# Patient Record
Sex: FEMALE | Race: WHITE | NOT HISPANIC OR LATINO | Employment: FULL TIME | URBAN - METROPOLITAN AREA
[De-identification: names, ages, dates, MRNs, and addresses within clinical notes are randomized per-mention and may not be internally consistent; named-entity substitution may affect disease eponyms.]

---

## 2018-10-17 ENCOUNTER — TRANSCRIBE ORDERS (OUTPATIENT)
Dept: PHYSICAL THERAPY | Facility: CLINIC | Age: 48
End: 2018-10-17

## 2018-10-17 DIAGNOSIS — M54.2 PAIN, NECK: ICD-10-CM

## 2018-10-17 DIAGNOSIS — M25.512 LEFT SHOULDER PAIN, UNSPECIFIED CHRONICITY: Primary | ICD-10-CM

## 2018-10-23 ENCOUNTER — TREATMENT (OUTPATIENT)
Dept: PHYSICAL THERAPY | Facility: CLINIC | Age: 48
End: 2018-10-23

## 2018-10-23 DIAGNOSIS — M54.2 CERVICAL PAIN (NECK): Primary | ICD-10-CM

## 2018-10-23 DIAGNOSIS — M54.50 ACUTE BILATERAL LOW BACK PAIN WITHOUT SCIATICA: ICD-10-CM

## 2018-10-23 DIAGNOSIS — M25.512 ACUTE PAIN OF LEFT SHOULDER: ICD-10-CM

## 2018-10-23 PROCEDURE — 97110 THERAPEUTIC EXERCISES: CPT | Performed by: PHYSICAL THERAPIST

## 2018-10-23 PROCEDURE — 97014 ELECTRIC STIMULATION THERAPY: CPT | Performed by: PHYSICAL THERAPIST

## 2018-10-23 PROCEDURE — 97161 PT EVAL LOW COMPLEX 20 MIN: CPT | Performed by: PHYSICAL THERAPIST

## 2018-10-23 NOTE — PROGRESS NOTES
Physical Therapy Initial Evaluation and Plan of Care        Subjective Evaluation    History of Present Illness  Date of onset: 10/8/2018  Mechanism of injury: Patient reports that she stepped back out of the way for a forkllift and feel backward onto steel.  She states that she had items in her hand and was not able to catch her fall.  She reports that currently she has increased cervical neck pain, left shoulder pain and low back pain.  Has some numbness noted in left UE, denies radicular symptoms in LE.  She states that although she suffers from migraines her headaches have intensified since the fall.   Describes pain as stiffness in neck and sharp shooting pain from neck down into her bilateral shoulders L>R.  Describes her low back as more of a constant pressure which intensifies with prolonged positioning.       Currently working on light duty.  No lifting overhead, prolonged standing, lifting 5#, sitting as needed   Functionallimited with prolonged standing, prolonged sitting, just cannot get comfort with sleeping, concentration is diminshed, bathing, dressing, meal  preparation, house work.      Overhead, out to the side, or behind her back.     Quality of life: good    Pain  Relieving factors: heat, medications and rest  Aggravating factors: squatting, prolonged positioning, outstretched reach, sleeping, standing and overhead activity  Progression: no change    Hand dominance: right    Treatments  Previous treatment: medication  Patient Goals  Patient goals for therapy: independence with ADLs/IADLs, return to sport/leisure activities, decreased pain and increased motion             Objective       Static Posture     Head  Forward.    Shoulders  Elevated and rounded.    Postural Observations  Seated posture: fair  Standing posture: fair  Correction of posture: makes symptoms worse        Palpation   Left   Hypertonic in the levator scapulae, lumbar paraspinals, quadratus lumborum, suboccipitals and upper  trapezius.   Tenderness of the anterior deltoid, biceps, levator scapulae, lumbar paraspinals, quadratus lumborum, suboccipitals and upper trapezius.     Right   Hypertonic in the levator scapulae, lumbar paraspinals, quadratus lumborum, suboccipitals and upper trapezius. Tenderness of the levator scapulae, lumbar paraspinals, quadratus lumborum, suboccipitals and upper trapezius.     Tenderness   Cervical Spine   Tenderness in the left 1st rib and right 1st rib.     Left Hip   Tenderness in the PSIS.     Right Hip   Tenderness in the PSIS.     Active Range of Motion   Left Shoulder   Flexion: 110 degrees with pain  Abduction: 90 degrees with pain  External rotation 45°: 60 degrees with pain    Right Shoulder   Normal active range of motion    Additional Active Range of Motion Details  Cervical AROM %  Cervical Flexion 50%  Cervical Extension 25%  Cervical Right Lateral Flexion 25%  Cervical Left Lateral Flexion 25%  Cervical Right Rotation 25%  Cervical Left Rotation 25%  Lumbar AROM %  Flexion 50%  Right Lateral Rotation 75%  Left Lateral Rotation 75%  Right Rotation 75%  Left Rotation 75%     Strength/Myotome Testing     Additional Strength Details  Strength not assess at evaluation         Assessment & Plan     Assessment  Impairments: abnormal or restricted ROM, activity intolerance, lacks appropriate home exercise program and pain with function  Assessment details: Patient is a 48 year old female who presents with pain in cervical spine, left shoulder and low back.   Cervical spine  Upon evaluation she has significant muscle guarding of suboccipital, cervical paraspinals, upper trap and levator with elevated scapula and first rib.  She has mild low of lordotic curvature.   Left shoulder  Her ROM of left shoulder is restricted to shoulder level in both flexion and abduction,  Her IR is to L5. Increased tenderness at bicipital groove. Strength and special testing not assessed at eval.   Low back  She has  moderate tenderness of lumbar paraspinals with minimal depth of penetration.  Her ROM is most limited in flexion and extension.  Negative SLR and Slump testing.   Prognosis: good  Functional Limitations: carrying objects, lifting, sleeping, walking, pulling, pushing, uncomfortable because of pain, standing and stooping  Goals  Plan Goals: Long Term Goals (10 weeks)    Patient is able to return to work/community activities with minimal to no discomfort  Patient is able to lift 25 lbs from floor to waist  Patient is able to lift 15 lbs from waist to shoulder  Patient is able to lift 10 lbs from shoulder to overhead  Patient is able stand for as long as needed for work/community related tasks.   Patient is able to sit for as long as needed for work/community related tasks.   Patient is able to reciprocally climb steps as needed for daily tasks.   Patient is able to work overhead as needed for work/community activities.       Short Term Goals (5 weeks)    Patient is independent with HEP  Patient is able to sleep without being disrupted by pain.   Patient has full AROM/PROM of left shoulder  Patient has full AROM of cervical spine  Patient has full AROm of lumbar spine  Patient has greater than 50% improvement overall.   Patient is able to perform bathing and grooming activities with use of involved UE extremity.   Patient has improved postural awareness.                 Plan  Therapy options: will be seen for skilled physical therapy services  Planned modality interventions: TENS, ultrasound, thermotherapy (hydrocollator packs) and cryotherapy  Planned therapy interventions: manual therapy, soft tissue mobilization, strengthening, stretching, therapeutic activities, joint mobilization, home exercise program, functional ROM exercises, flexibility and body mechanics training  Duration in visits: 20  Treatment plan discussed with: patient          Therapeutic Exercise:    20     mins  [id number];       Timed Treatment:   20    mins   Total Treatment:     55   mins    PT SIGNATURE: Za Madrid, PT   DATE TREATMENT INITIATED: 10/23/2018    Initial Certification  Certification Period: 1/21/2019  I certify that the therapy services are furnished while this patient is under my care.  The services outlined above are required by this patient, and will be reviewed every 90 days.     PHYSICIAN: Kit Pitts MD      DATE:     Please sign and return via fax to [phone].. Thank you, [facility].

## 2018-10-25 ENCOUNTER — TREATMENT (OUTPATIENT)
Dept: PHYSICAL THERAPY | Facility: CLINIC | Age: 48
End: 2018-10-25

## 2018-10-25 DIAGNOSIS — M25.512 ACUTE PAIN OF LEFT SHOULDER: ICD-10-CM

## 2018-10-25 DIAGNOSIS — M54.2 CERVICAL PAIN (NECK): Primary | ICD-10-CM

## 2018-10-25 DIAGNOSIS — M54.50 ACUTE BILATERAL LOW BACK PAIN WITHOUT SCIATICA: ICD-10-CM

## 2018-10-25 PROCEDURE — 97110 THERAPEUTIC EXERCISES: CPT | Performed by: PHYSICAL THERAPIST

## 2018-10-25 PROCEDURE — 97014 ELECTRIC STIMULATION THERAPY: CPT | Performed by: PHYSICAL THERAPIST

## 2018-10-25 PROCEDURE — 97140 MANUAL THERAPY 1/> REGIONS: CPT | Performed by: PHYSICAL THERAPIST

## 2018-10-25 NOTE — PROGRESS NOTES
Physical Therapy Daily Progress Note      Visit # 2      Subjective Evaluation    History of Present Illness    Subjective comment: Patient reports that yesterday she was moving around some clothes and started to have increased neck pain that was going up into her head.  She had to stop and lay down.  Overall she felt really good following last treatment session and felt that she had improved mobility however has lost some of that.        Objective   See Exercise, Manual, and Modality Logs for complete treatment.       Assessment & Plan     Assessment  Assessment details: Patient continues to have increased muscle guarding through cervical paraspinals and suboccipital.  Decreased cervical ROM at end range flexion, rotation and lateral flexion however improved from initial evaluation.  Shoulder passive ROM is limited to 50 degrees ER and 90 flexion with mild inferior capsular limitation. Responded well to treatment and states that she felt better leaving.     Plan  Plan details: Progress as tolerated.                      Manual Therapy:    15     mins  60336;  Therapeutic Exercise:    28     mins  67628;       Timed Treatment:   43   mins   Total Treatment:     58   mins    Za Madrid, PT  Physical Therapist

## 2018-10-29 ENCOUNTER — TREATMENT (OUTPATIENT)
Dept: PHYSICAL THERAPY | Facility: CLINIC | Age: 48
End: 2018-10-29

## 2018-10-29 DIAGNOSIS — M54.2 CERVICAL PAIN (NECK): ICD-10-CM

## 2018-10-29 DIAGNOSIS — M54.50 ACUTE BILATERAL LOW BACK PAIN WITHOUT SCIATICA: ICD-10-CM

## 2018-10-29 DIAGNOSIS — M25.512 ACUTE PAIN OF LEFT SHOULDER: Primary | ICD-10-CM

## 2018-10-29 PROCEDURE — 97110 THERAPEUTIC EXERCISES: CPT | Performed by: PHYSICAL THERAPIST

## 2018-10-29 PROCEDURE — 97140 MANUAL THERAPY 1/> REGIONS: CPT | Performed by: PHYSICAL THERAPIST

## 2018-10-29 PROCEDURE — 97014 ELECTRIC STIMULATION THERAPY: CPT | Performed by: PHYSICAL THERAPIST

## 2018-10-29 NOTE — PROGRESS NOTES
Physical Therapy Daily Progress Note      Visit # 3      Subjective Evaluation    History of Present Illness    Subjective comment: Patient reports that overall she feels like her neck ROM is slightly better but she attempted to do some chores at home and she had intense cervical neck pain and had to lay down.  Also is having intense pain in her hip from where she initially received her first coristone shot.        Objective   See Exercise, Manual, and Modality Logs for complete treatment.       Assessment & Plan     Assessment  Assessment details: Patient tolerated treatment well.  She has improved cervical neck ROM and decreased suboccipital tightness. Left ROM is still limited. Suggested she follow up with the MD regarding her hip pain from the shot.                      Manual Therapy:    12     mins  04431;  Therapeutic Exercise:    25     mins  04432;       Timed Treatment:   37   mins   Total Treatment:     52   mins    Za Madrid, PT  Physical Therapist

## 2018-10-30 ENCOUNTER — TREATMENT (OUTPATIENT)
Dept: PHYSICAL THERAPY | Facility: CLINIC | Age: 48
End: 2018-10-30

## 2018-10-30 DIAGNOSIS — M54.2 CERVICAL PAIN (NECK): ICD-10-CM

## 2018-10-30 DIAGNOSIS — M54.50 ACUTE BILATERAL LOW BACK PAIN WITHOUT SCIATICA: ICD-10-CM

## 2018-10-30 DIAGNOSIS — M25.512 ACUTE PAIN OF LEFT SHOULDER: Primary | ICD-10-CM

## 2018-10-30 PROCEDURE — 97140 MANUAL THERAPY 1/> REGIONS: CPT | Performed by: PHYSICAL THERAPIST

## 2018-10-30 PROCEDURE — 97110 THERAPEUTIC EXERCISES: CPT | Performed by: PHYSICAL THERAPIST

## 2018-10-30 PROCEDURE — 97035 APP MDLTY 1+ULTRASOUND EA 15: CPT | Performed by: PHYSICAL THERAPIST

## 2018-10-30 NOTE — PROGRESS NOTES
"Re-Assessment / Re-Certification        Patient: Ariadne Sun   : 1970  Diagnosis/ICD-10 Code:  Acute pain of left shoulder [M25.512]  Referring practitioner: No ref. provider found  Date of Initial Evaluation:  Type: THERAPY  Noted: 10/23/2018  Patient seen for 4 sessions      Subjective:   Ariadne Sun reports: Patient reports that overall the pain is better but still having muscle spasms in her neck and shoulder blade.  Stated that over the weekend she was changing out her clothes in her closet to her winter clothes and had increased spasms where she had to stop and lay down due to the intensity.  She also reports severe soreness and \"pinching\" in her left hip where she received her first injection.  The second injection site on the right is fine.     Clinical Progress: improved  Home Program Compliance: Yes  Treatment has included: therapeutic exercise, manual therapy and electrical stimulation    Subjective   Objective       Static Posture     Head  Forward.    Shoulders  Elevated and rounded.    Postural Observations  Seated posture: fair  Standing posture: fair  Correction of posture: makes symptoms worse        Palpation   Left   Hypertonic in the levator scapulae, lumbar paraspinals, quadratus lumborum, suboccipitals and upper trapezius.   Tenderness of the anterior deltoid, biceps, levator scapulae, lumbar paraspinals, quadratus lumborum, suboccipitals and upper trapezius.     Right   Hypertonic in the suboccipitals. Tenderness of the suboccipitals.     Tenderness   Cervical Spine   Tenderness in the left 1st rib.     Left Hip   Tenderness in the PSIS.     Active Range of Motion   Left Shoulder   Flexion: 140 degrees with pain  Abduction: 120 degrees with pain  External rotation 45°: 60 degrees with pain    Right Shoulder   Normal active range of motion    Additional Active Range of Motion Details  Cervical AROM %  Cervical Flexion 75%  Cervical Extension 50%  Cervical Right Lateral " Flexion 75%  Cervical Left Lateral Flexion 50%  Cervical Right Rotation 75%  Cervical Left Rotation 50%  Lumbar AROM %  Flexion 50%  Right Lateral Rotation 75%  Left Lateral Rotation 75%  Right Rotation 75%  Left Rotation 75%     Strength/Myotome Testing     Additional Strength Details  Strength not assess at evaluation     Assessment & Plan     Assessment  Assessment details: Patient has tolerated treatment fairly well.  She has decreased muscle guarding of cervical musculature with improved joint mobility however still has mld suboccipital bilaterally and joint restrictions at C5-6 with limitations in left rotation.  Elevated first rib on left. Shoulder AROM has improved however painful arc persists. Patient has had consistent complaints of left hip pain at injection site.     Plan  Plan details: Would recommend continued physical therapy to address remaining issues.       Progress toward previous goals: Partially Met      PT Signature: Za Madrid, PT      Based upon review of the patient's progress and continued therapy plan, it is my medical opinion that Ariadne Sun should continue physical therapy treatment at [facility]  [address] [place] [id number] [phone].    Signature: __________________________________      Manual Therapy:    10     mins  [id number];  Therapeutic Exercise:    30     mins  35459;     Ultrasound:     8     mins  50117;        Timed Treatment:   48   mins   Total Treatment:     63   mins

## 2018-11-05 ENCOUNTER — TREATMENT (OUTPATIENT)
Dept: PHYSICAL THERAPY | Facility: CLINIC | Age: 48
End: 2018-11-05

## 2018-11-05 DIAGNOSIS — M54.2 CERVICAL PAIN (NECK): Primary | ICD-10-CM

## 2018-11-05 DIAGNOSIS — M25.512 ACUTE PAIN OF LEFT SHOULDER: ICD-10-CM

## 2018-11-05 PROCEDURE — 97014 ELECTRIC STIMULATION THERAPY: CPT | Performed by: PHYSICAL THERAPIST

## 2018-11-05 PROCEDURE — 97110 THERAPEUTIC EXERCISES: CPT | Performed by: PHYSICAL THERAPIST

## 2018-11-05 PROCEDURE — 97035 APP MDLTY 1+ULTRASOUND EA 15: CPT | Performed by: PHYSICAL THERAPIST

## 2018-11-05 PROCEDURE — 97140 MANUAL THERAPY 1/> REGIONS: CPT | Performed by: PHYSICAL THERAPIST

## 2018-11-05 NOTE — PROGRESS NOTES
Physical Therapy Daily Progress Note      Visit # 5      Subjective Evaluation    History of Present Illness    Subjective comment: Patient reports that the MD switched up her medication and started her on a steriod pack and she states that pain has been better. She reports that she has limited her activity level as well .        Objective   See Exercise, Manual, and Modality Logs for complete treatment.       Assessment & Plan     Assessment  Assessment details: Patient tolerated treatment well.  She was able to tolerated manual therapy today which she had minimal tolerance to before. Still limited in end range ER in shoulder as well as end range cervical rotation and extension.  Upper trap and anterior deltoid tightness persists.                      Manual Therapy:    12     mins  02453;  Therapeutic Exercise:    25     mins  80365;         Ultrasound:     8     mins  32346;      Timed Treatment:   45   mins   Total Treatment:     60   mins    Za Madrid, PT  Physical Therapist

## 2018-11-06 ENCOUNTER — TREATMENT (OUTPATIENT)
Dept: PHYSICAL THERAPY | Facility: CLINIC | Age: 48
End: 2018-11-06

## 2018-11-06 DIAGNOSIS — M54.50 ACUTE BILATERAL LOW BACK PAIN WITHOUT SCIATICA: ICD-10-CM

## 2018-11-06 DIAGNOSIS — M54.2 CERVICAL PAIN (NECK): Primary | ICD-10-CM

## 2018-11-06 DIAGNOSIS — M25.512 ACUTE PAIN OF LEFT SHOULDER: ICD-10-CM

## 2018-11-06 PROCEDURE — 97110 THERAPEUTIC EXERCISES: CPT | Performed by: PHYSICAL THERAPIST

## 2018-11-06 PROCEDURE — 97140 MANUAL THERAPY 1/> REGIONS: CPT | Performed by: PHYSICAL THERAPIST

## 2018-11-06 NOTE — PROGRESS NOTES
Re-Assessment / Re-Certification        Patient: Ariadne Sun   : 1970  Diagnosis/ICD-10 Code:  No primary diagnosis found.  Referring practitioner: No ref. provider found  Date of Initial Evaluation:  Type: THERAPY  Noted: 10/23/2018  Patient seen for 6 sessions      Subjective:   Ariadne Sun reports: Patient reports that both her neck and back are feeling better.  Having some dental pain which is the primary focus for her right now.     Clinical Progress: improved  Home Program Compliance: Yes  Treatment has included: therapeutic exercise, manual therapy and electrical stimulation    Subjective   Objective       Static Posture     Head  Forward.    Shoulders  Elevated and rounded.    Postural Observations  Seated posture: fair  Standing posture: fair  Correction of posture: makes symptoms worse        Palpation   Left   Hypertonic in the levator scapulae, lumbar paraspinals, quadratus lumborum, suboccipitals and upper trapezius.   Tenderness of the anterior deltoid, biceps, levator scapulae, lumbar paraspinals, quadratus lumborum, suboccipitals and upper trapezius.     Right   Hypertonic in the suboccipitals. Tenderness of the suboccipitals.     Tenderness   Cervical Spine   Tenderness in the left 1st rib.     Left Hip   Tenderness in the PSIS.     Active Range of Motion   Left Shoulder   Flexion: 140 degrees with pain  Abduction: 120 degrees with pain  External rotation 45°: 60 degrees with pain    Right Shoulder   Normal active range of motion    Additional Active Range of Motion Details  Cervical AROM %  Cervical Flexion 75%  Cervical Extension 50%  Cervical Right Lateral Flexion 75%  Cervical Left Lateral Flexion 50%  Cervical Right Rotation 75%  Cervical Left Rotation 50%  Lumbar AROM %  Flexion 50%  Right Lateral Rotation 75%  Left Lateral Rotation 75%  Right Rotation 75%  Left Rotation 75%     Passive Range of Motion   Left Shoulder   Flexion: 175 degrees   Abduction: 165 degrees    External rotation 90°: 70 degrees      Assessment & Plan     Assessment  Assessment details: Patient has tolerated treatment well. She has improved cervical segmental mobility however still limited at very end range left rotation.  Left shoulder AROM/PROm are both improved however pain at end range.  Patient continues to have significant tightness at suboccipital and left upper trap and levator.  I believe Ms. Sun would benefit from continues physical therapy to address remaining deficits and progress toward work specific tasks.     Plan  Plan details: Progress for 2 additional weeks.       Progress toward previous goals: Partially Met      PT Signature: Za Madrid, PT      Based upon review of the patient's progress and continued therapy plan, it is my medical opinion that Ariadne Sun should continue physical therapy treatment at [facility]  [address] [place] [id number] [phone].    Signature: __________________________________      Manual Therapy:    10     mins  76623;  Therapeutic Exercise:    30     mins  40429;       Timed Treatment:   40   mins   Total Treatment:     50   mins

## 2018-11-08 ENCOUNTER — TREATMENT (OUTPATIENT)
Dept: PHYSICAL THERAPY | Facility: CLINIC | Age: 48
End: 2018-11-08

## 2018-11-08 DIAGNOSIS — M54.2 CERVICAL PAIN (NECK): Primary | ICD-10-CM

## 2018-11-08 DIAGNOSIS — M54.50 ACUTE BILATERAL LOW BACK PAIN WITHOUT SCIATICA: ICD-10-CM

## 2018-11-08 DIAGNOSIS — M25.512 ACUTE PAIN OF LEFT SHOULDER: ICD-10-CM

## 2018-11-08 PROCEDURE — 97140 MANUAL THERAPY 1/> REGIONS: CPT | Performed by: PHYSICAL THERAPIST

## 2018-11-08 PROCEDURE — 97014 ELECTRIC STIMULATION THERAPY: CPT | Performed by: PHYSICAL THERAPIST

## 2018-11-08 PROCEDURE — 97110 THERAPEUTIC EXERCISES: CPT | Performed by: PHYSICAL THERAPIST

## 2018-11-08 NOTE — PROGRESS NOTES
Physical Therapy Daily Progress Note      Visit # 7      Subjective Evaluation    History of Present Illness    Subjective comment: Patient reports that both her neck and back are bothering her today.  She states that she went back to the MD and he would like to continue with therapy.         Objective   See Exercise, Manual, and Modality Logs for complete treatment.       Assessment & Plan     Assessment  Assessment details: Patient tolerated treatment fairly well.  She has improved passive cervical ROM to near normal limits.  Shoulder ROM is only mildly limited at end range ER and IR.  Full flexion noted. Low back pain persists with increased tenderness noted at left piriformis.      Plan  Plan details: Progress as tolerated.                      Manual Therapy:    10     mins  48989;  Therapeutic Exercise:    30     mins  95503;       Timed Treatment:   40   mins   Total Treatment:     55   mins    Za Madrid, PT  Physical Therapist

## 2018-11-12 ENCOUNTER — TREATMENT (OUTPATIENT)
Dept: PHYSICAL THERAPY | Facility: CLINIC | Age: 48
End: 2018-11-12

## 2018-11-12 DIAGNOSIS — M25.512 ACUTE PAIN OF LEFT SHOULDER: ICD-10-CM

## 2018-11-12 DIAGNOSIS — M54.50 ACUTE BILATERAL LOW BACK PAIN WITHOUT SCIATICA: ICD-10-CM

## 2018-11-12 DIAGNOSIS — M54.2 CERVICAL PAIN (NECK): Primary | ICD-10-CM

## 2018-11-12 PROCEDURE — 97110 THERAPEUTIC EXERCISES: CPT | Performed by: PHYSICAL THERAPIST

## 2018-11-12 PROCEDURE — 97014 ELECTRIC STIMULATION THERAPY: CPT | Performed by: PHYSICAL THERAPIST

## 2018-11-12 NOTE — PROGRESS NOTES
Physical Therapy Daily Progress Note      Visit # 8      Subjective Evaluation    History of Present Illness    Subjective comment: patient reports that her neck and low back are getting better. Had some increase in pain in her mid back over the weekend which was relieved by heating pad and stretching.        Objective   See Exercise, Manual, and Modality Logs for complete treatment.       Assessment & Plan     Assessment  Assessment details: Patient tolerated treatment fairly well.  Had some end range right rotation limitations with increased muscle spasms in left scapula.  Moist head and stretches were mildly aggravating to this area.  Overall cervical AROM is approaching normal limits. Still reports pain in left hip/piriformis area.     Plan  Plan details: Progress as tolerated.                        Therapeutic Exercise:    40     mins  24676;       Timed Treatment:   40   mins   Total Treatment:     55   mins    Za Madrid, PT  Physical Therapist

## 2018-11-13 ENCOUNTER — TREATMENT (OUTPATIENT)
Dept: PHYSICAL THERAPY | Facility: CLINIC | Age: 48
End: 2018-11-13

## 2018-11-13 DIAGNOSIS — M54.2 CERVICAL PAIN (NECK): Primary | ICD-10-CM

## 2018-11-13 DIAGNOSIS — M54.50 ACUTE BILATERAL LOW BACK PAIN WITHOUT SCIATICA: ICD-10-CM

## 2018-11-13 DIAGNOSIS — M25.512 ACUTE PAIN OF LEFT SHOULDER: ICD-10-CM

## 2018-11-13 PROCEDURE — 97014 ELECTRIC STIMULATION THERAPY: CPT | Performed by: PHYSICAL THERAPIST

## 2018-11-13 PROCEDURE — 97110 THERAPEUTIC EXERCISES: CPT | Performed by: PHYSICAL THERAPIST

## 2018-11-13 NOTE — PROGRESS NOTES
Re-Assessment / Re-Certification        Patient: Ariadne Sun   : 1970  Diagnosis/ICD-10 Code:  Cervical pain (neck) [M54.2]  Referring practitioner: Kit Pitts MD  Date of Initial Evaluation:  Type: THERAPY  Noted: 10/23/2018  Patient seen for 9 sessions      Subjective:   Ariadne Sun reports: Patient reports that her neck is feeling approximately 75% better with only mild tightness. Low back is 65% better and is more annoying than pain.   Her biggest concern is her left shoulder.  She continues to have muscle spasms in scapular area and has limited range of motion.     Clinical Progress: improved  Home Program Compliance: Yes  Treatment has included: therapeutic exercise, manual therapy and electrical stimulation    Subjective   Objective       Static Posture     Head  Forward.    Shoulders  Elevated and rounded.    Postural Observations  Seated posture: fair  Standing posture: fair  Correction of posture: makes symptoms worse        Palpation   Left   No palpable tenderness to the biceps.   Hypertonic in the lumbar paraspinals, quadratus lumborum, suboccipitals and upper trapezius.   Tenderness of the anterior deltoid, biceps, lumbar paraspinals, middle trapezius, quadratus lumborum, rhomboids, suboccipitals, thoracic paraspinals and upper trapezius.     Right   Hypertonic in the suboccipitals. Tenderness of the suboccipitals.     Tenderness   Cervical Spine   Tenderness in the left 1st rib.     Left Hip   Tenderness in the PSIS.     Active Range of Motion   Left Shoulder   Flexion: 120 degrees with pain  Abduction: 100 degrees with pain  External rotation 45°: 60 degrees with pain    Right Shoulder   Normal active range of motion    Additional Active Range of Motion Details  Cervical AROM %  Cervical Flexion 75%  Cervical Extension 50%  Cervical Right Lateral Flexion 75%  Cervical Left Lateral Flexion 50%  Cervical Right Rotation 75%  Cervical Left Rotation 50%  Lumbar AROM  "%  Flexion 50%  Right Lateral Rotation 75%  Left Lateral Rotation 75%  Right Rotation 75%  Left Rotation 75%     Passive Range of Motion   Left Shoulder   Flexion: 175 degrees   Abduction: 165 degrees   External rotation 90°: 70 degrees     Scapular Mobility   Left Shoulder   Scapular mobility: fair  Scapular Mobility with Shoulder to 90° FF   Scapular elevation: moderate     Assessment & Plan     Assessment  Assessment details: Patient tolerated treatment fairly well.  She has improved passive cervical ROM to near normal limits with only mild sub occipital tightness.   Lumbar spine is gradually progressing with near full AROM in all planes of motion.  She still has some mild tenderness at left piriformis.  Her biggest complaint is her left shoulder.  Her ROM is slightly decreased as compared to last measurement.  She has increased tenderness along anterior joint line and supraspinatus.  Poor scapular rhythm noted with elevation of scapula and pain along superior and medial border.  Has had some incidences of \"muscle spasms\" in thoracic spine. We have been treating Ms. Sun for multiple body parts which I believe has delayed Ms. Sun's progress however I believe should would benefit from continued physical therapy.     Plan  Plan details: Please advise.       Progress toward previous goals: Partially Met      PT Signature: Za Madrid PT      Based upon review of the patient's progress and continued therapy plan, it is my medical opinion that Ariadne Sun should continue physical therapy treatment at [facility]  [address] [place] [id number] [phone].    Signature: __________________________________  Kit Pitts MD      Therapeutic Exercise:    40     mins  80214;       Timed Treatment:   40   mins   Total Treatment:     55   mins      "

## 2018-11-15 ENCOUNTER — TREATMENT (OUTPATIENT)
Dept: PHYSICAL THERAPY | Facility: CLINIC | Age: 48
End: 2018-11-15

## 2018-11-15 DIAGNOSIS — M25.512 ACUTE PAIN OF LEFT SHOULDER: ICD-10-CM

## 2018-11-15 DIAGNOSIS — M54.2 CERVICAL PAIN (NECK): Primary | ICD-10-CM

## 2018-11-15 DIAGNOSIS — M54.50 ACUTE BILATERAL LOW BACK PAIN WITHOUT SCIATICA: ICD-10-CM

## 2018-11-15 PROCEDURE — 97014 ELECTRIC STIMULATION THERAPY: CPT | Performed by: PHYSICAL THERAPIST

## 2018-11-15 PROCEDURE — 97110 THERAPEUTIC EXERCISES: CPT | Performed by: PHYSICAL THERAPIST

## 2018-11-15 PROCEDURE — 97140 MANUAL THERAPY 1/> REGIONS: CPT | Performed by: PHYSICAL THERAPIST

## 2018-11-15 NOTE — PROGRESS NOTES
Physical Therapy Daily Progress Note      Visit # 10      Subjective Evaluation    History of Present Illness    Subjective comment: Patient reports that her neck and back are feeling better but her shoulder continues to give her problems.         Objective   See Exercise, Manual, and Modality Logs for complete treatment.       Assessment & Plan     Assessment  Assessment details: Patient experienced significant muscle spasms in her thoracic spine while PT was performing manual therapy for cervical spine. Spasms were mildly relieved with stretches and even greater with estim.  Remaining exercises were held due to increased in symptoms.  Patient continues to have significant muscle guarding and tenderness along medial scapula and thoracic paraspinals.     Plan  Plan details: Still awaiting MRI.  Will proceed as tolerated.                      Manual Therapy:    8     mins  67098;  Therapeutic Exercise:    15     mins  98324;         Timed Treatment:   23   mins   Total Treatment:     50   mins    Za Madrid, PT  Physical Therapist

## 2018-11-20 ENCOUNTER — TREATMENT (OUTPATIENT)
Dept: PHYSICAL THERAPY | Facility: CLINIC | Age: 48
End: 2018-11-20

## 2018-11-20 DIAGNOSIS — M54.2 CERVICAL PAIN (NECK): Primary | ICD-10-CM

## 2018-11-20 DIAGNOSIS — M25.512 ACUTE PAIN OF LEFT SHOULDER: ICD-10-CM

## 2018-11-20 DIAGNOSIS — M54.50 ACUTE BILATERAL LOW BACK PAIN WITHOUT SCIATICA: ICD-10-CM

## 2018-11-20 PROCEDURE — 97110 THERAPEUTIC EXERCISES: CPT | Performed by: PHYSICAL THERAPIST

## 2018-11-20 NOTE — PROGRESS NOTES
Re-Assessment / Re-Certification        Patient: Ariadne Sun   : 1970  Diagnosis/ICD-10 Code:  Cervical pain (neck) [M54.2]  Referring practitioner: Kit Pitts MD  Date of Initial Evaluation:  Type: THERAPY  Noted: 10/23/2018  Patient seen for 11 sessions      Subjective:   Ariadne Sun reports:    Clinical Progress: improved  Home Program Compliance: Yes  Treatment has included: therapeutic exercise and manual therapy    Subjective Evaluation    History of Present Illness    Subjective comment: The pt reports she cannot use her L arm/shoulder at her PLOF with lifting and reaching behind herself.  Her neck pains are mostly resolved with being able to move in all directions, but has occasional (1xweek) of a muscle spasm, but connects it to stress.  She reports she has pressure in her low back if hse performs prolong time on her feet, 2-4 hours of standing.  She is working and able to complete her job duties, but has pain during them.  She reports compliance with her HEP.       Objective       Active Range of Motion     Additional Active Range of Motion Details  Left Shoulder   Flexion: 180 degrees with pain  Abduction: 10 degrees with pain  External rotation OH: C4  IR BTB: T9    Cervical AROM %  Cervical Flexion 100%  Cervical Extension 100%  Cervical Right Lateral Flexion 100%  Cervical Left Lateral Flexion 100%  Cervical Right Rotation 100%  Cervical Left Rotation 100%    Lumbar AROM %  Flexion 95%, no pain  Right Lateral Rotation 95%, pain in center of low back  Left Lateral Rotation 95%, pain in center of low back  Right Rotation 95%, no pain  Left Rotation 95%, no pain     Assessment & Plan     Assessment  Assessment details: The pt continues to have reports of shoulder pains and limitations.  Her cervical and lumbar abilities are improving.  She has full cervical ROM without pain.  She does have pain with some lumbar movements.  The pt demo good tolerance to complete her exercise  log while in clinic without reproduction of pain.  The pt would benefit from continued PT care to address her shoulder and lumbar limitations to assist in her gradual return to a more independent LOF to complete work duties and home tasks at her PLOF.    Prognosis details: Goals: Long Term Goals (10 weeks)    Patient is able to return to work/community activities with minimal to no discomfort. NOT MET  Patient is able to lift 25 lbs from floor to waist. NOT MET  Patient is able to lift 15 lbs from waist to shoulder. NOT MET  Patient is able to lift 10 lbs from shoulder to overhead. NOT MET  Patient is able stand for as long as needed for work/community related tasks. MET  Patient is able to sit for as long as needed for work/community related tasks. MET  Patient is able to reciprocally climb steps as needed for daily tasks.  MET  Patient is able to work overhead as needed for work/community activities. MET      Short Term Goals (5 weeks)    Patient is independent with HEP. MET  Patient is able to sleep without being disrupted by pain. NOT MET, subjective report of L shoulder pain wakes her up a few times a week.  Patient has full AROM/PROM of left shoulder.   Patient has full AROM of cervical spine.   Patient has full AROm of lumbar spine.   Patient has greater than 50% improvement overall. Neck: 100%, L shoulder: 40%, low back: 80%  Patient is able to perform bathing and grooming activities with use of involved UE extremity. MET, but has pain/discomforts.  Patient has improved postural awareness. MET    Plan  Plan details: Continue skilled PT care to address functional limitations of UE and lumbar ROM, strength, and simulation of work duties to return the pt to her PLOF, as appropriate.        Progress toward previous goals: Partially Met  Recommendations: Continue as planned  Timeframe: 1 month  Prognosis to achieve goals: good    PT Signature: Herminia Estrada, PT      Based upon review of the patient's progress and  continued therapy plan, it is my medical opinion that Ariadne Sun should continue physical therapy treatment at [facility]  [address] [place] [id number] [phone].    Signature: __________________________________  Kit Pitts MD    Manual Therapy:    0     mins  49552;  Therapeutic Exercise:    58     mins  83339;     Neuromuscular Caroline:    0    mins  20598;    Therapeutic Activity:     0     mins  68719;     Gait Trainin     mins  [id number];     Ultrasound:     0     mins  37380;    Work Hardening           0      mins [id number]  Iontophoresis               0   mins [id number]    Timed Treatment:   58   mins   Total Treatment:     58   mins

## 2019-01-03 ENCOUNTER — HOSPITAL ENCOUNTER (OUTPATIENT)
Dept: CARDIOLOGY | Facility: HOSPITAL | Age: 49
Discharge: HOME | End: 2019-01-03
Attending: ORTHOPAEDIC SURGERY | Admitting: ORTHOPAEDIC SURGERY

## 2019-01-03 ENCOUNTER — LAB (OUTPATIENT)
Dept: LAB | Facility: HOSPITAL | Age: 49
End: 2019-01-03
Attending: ORTHOPAEDIC SURGERY

## 2019-01-03 ENCOUNTER — TRANSCRIBE ORDERS (OUTPATIENT)
Dept: ADMINISTRATIVE | Facility: HOSPITAL | Age: 49
End: 2019-01-03

## 2019-01-03 DIAGNOSIS — Z01.818 PREOP TESTING: ICD-10-CM

## 2019-01-03 DIAGNOSIS — M75.102 TEAR OF LEFT ROTATOR CUFF, UNSPECIFIED TEAR EXTENT: ICD-10-CM

## 2019-01-03 DIAGNOSIS — Z01.818 PREOP TESTING: Primary | ICD-10-CM

## 2019-01-03 LAB
ALBUMIN SERPL-MCNC: 4.9 G/DL (ref 3.5–5.2)
ALBUMIN/GLOB SERPL: 1.5 G/DL
ALP SERPL-CCNC: 85 U/L (ref 39–117)
ALT SERPL W P-5'-P-CCNC: 20 U/L (ref 1–33)
ANION GAP SERPL CALCULATED.3IONS-SCNC: 14.2 MMOL/L
AST SERPL-CCNC: 18 U/L (ref 1–32)
BASOPHILS # BLD AUTO: 0.08 10*3/MM3 (ref 0–0.2)
BASOPHILS NFR BLD AUTO: 1.1 % (ref 0–1.5)
BILIRUB SERPL-MCNC: 0.3 MG/DL (ref 0.1–1.2)
BUN BLD-MCNC: 10 MG/DL (ref 6–20)
BUN/CREAT SERPL: 9 (ref 7–25)
CALCIUM SPEC-SCNC: 9.7 MG/DL (ref 8.6–10.5)
CHLORIDE SERPL-SCNC: 106 MMOL/L (ref 98–107)
CO2 SERPL-SCNC: 22.8 MMOL/L (ref 22–29)
CREAT BLD-MCNC: 1.11 MG/DL (ref 0.57–1)
DEPRECATED RDW RBC AUTO: 44.6 FL (ref 37–54)
EOSINOPHIL # BLD AUTO: 0.17 10*3/MM3 (ref 0–0.7)
EOSINOPHIL NFR BLD AUTO: 2.3 % (ref 0.3–6.2)
ERYTHROCYTE [DISTWIDTH] IN BLOOD BY AUTOMATED COUNT: 13.2 % (ref 11.7–13)
GFR SERPL CREATININE-BSD FRML MDRD: 52 ML/MIN/1.73
GLOBULIN UR ELPH-MCNC: 3.2 GM/DL
GLUCOSE BLD-MCNC: 99 MG/DL (ref 65–99)
HCT VFR BLD AUTO: 44.7 % (ref 35.6–45.5)
HGB BLD-MCNC: 15.6 G/DL (ref 11.9–15.5)
IMM GRANULOCYTES # BLD AUTO: 0.05 10*3/MM3 (ref 0–0.03)
IMM GRANULOCYTES NFR BLD AUTO: 0.7 % (ref 0–0.5)
LYMPHOCYTES # BLD AUTO: 1.86 10*3/MM3 (ref 0.9–4.8)
LYMPHOCYTES NFR BLD AUTO: 24.8 % (ref 19.6–45.3)
MCH RBC QN AUTO: 32 PG (ref 26.9–32)
MCHC RBC AUTO-ENTMCNC: 34.9 G/DL (ref 32.4–36.3)
MCV RBC AUTO: 91.6 FL (ref 80.5–98.2)
MONOCYTES # BLD AUTO: 0.39 10*3/MM3 (ref 0.2–1.2)
MONOCYTES NFR BLD AUTO: 5.2 % (ref 5–12)
NEUTROPHILS # BLD AUTO: 4.95 10*3/MM3 (ref 1.9–8.1)
NEUTROPHILS NFR BLD AUTO: 65.9 % (ref 42.7–76)
NRBC BLD AUTO-RTO: 0 /100 WBC (ref 0–0)
PLATELET # BLD AUTO: 401 10*3/MM3 (ref 140–500)
PMV BLD AUTO: 9.2 FL (ref 6–12)
POTASSIUM BLD-SCNC: 4 MMOL/L (ref 3.5–5.2)
PROT SERPL-MCNC: 8.1 G/DL (ref 6–8.5)
RBC # BLD AUTO: 4.88 10*6/MM3 (ref 3.9–5.2)
SODIUM BLD-SCNC: 143 MMOL/L (ref 136–145)
WBC NRBC COR # BLD: 7.5 10*3/MM3 (ref 4.5–10.7)

## 2019-01-03 PROCEDURE — 80053 COMPREHEN METABOLIC PANEL: CPT

## 2019-01-03 PROCEDURE — 85025 COMPLETE CBC W/AUTO DIFF WBC: CPT

## 2019-01-03 PROCEDURE — 36415 COLL VENOUS BLD VENIPUNCTURE: CPT

## 2019-01-03 PROCEDURE — 93010 ELECTROCARDIOGRAM REPORT: CPT | Performed by: INTERNAL MEDICINE

## 2019-01-03 PROCEDURE — 93005 ELECTROCARDIOGRAM TRACING: CPT | Performed by: ORTHOPAEDIC SURGERY
